# Patient Record
Sex: MALE | URBAN - METROPOLITAN AREA
[De-identification: names, ages, dates, MRNs, and addresses within clinical notes are randomized per-mention and may not be internally consistent; named-entity substitution may affect disease eponyms.]

---

## 2020-06-25 ENCOUNTER — OFFICE VISIT (OUTPATIENT)
Dept: PRIMARY CARE CLINIC | Age: 45
End: 2020-06-25

## 2020-06-25 VITALS — OXYGEN SATURATION: 98 % | TEMPERATURE: 97.5 F | HEART RATE: 56 BPM

## 2020-06-25 PROCEDURE — 99212 OFFICE O/P EST SF 10 MIN: CPT | Performed by: NURSE PRACTITIONER

## 2020-06-25 NOTE — PROGRESS NOTES
ordered  [] POC Strep ordered  [x] COVID 19 ordered    RESULTS:  No results found for this visit on 06/25/20. ASSESSMENT/PLAN:  Sri Wong was seen today for concern for covid-19. Diagnoses and all orders for this visit:    Close exposure to COVID-19 virus  -     COVID-19 Ambulatory; Future  -     COVID-19 Ambulatory        Patient instructed to call PCP if symptoms worsen or fail to improve, and to go to the ED if develops red flags (these symptoms were reviewed with patient). Patient informed can also return to this site for reassessment of related symptoms. Patient expressed understanding. Discharge home with written instructions for:  [] Flu management including medication treatment if qualifies  [x] CDC guildelines for self-quarantine in an asymptomatic patient with COVID-19 exposure   (2 weeks if no symptoms. If symptoms develop then patient follows isolation guidelines below.)  [] CDC guidelines for isolation in symptomatic patient with assumed COVID-19;        TO END ISOLATION: (Patient understands these guidelines are subject to change)  1. No fever for at least 72 hours without medications AND  2. Symptoms have resolved AND  3. At least 10 days from initial symptom onset    Patient understands that a hands-on exam could not be completed during this high risk assessment due to the COVID-19 pandemic. This note will be routed to the patient's PCP to inform of this limited assessment. Written by Ty Ron MA acting as a scribe for Sue England CNP on 6/25/2020 at 7:28 PM.    I, JHON Parra CNP, personally performed the services described in the documentation as scribed by Ty Ron CMA, in my presence and it is both accurate and complete.     Electronically signed by JHON Parra CNP on 6/25/2020 at 7:28 PM.

## 2020-06-27 LAB
SARS-COV-2: NOT DETECTED
SOURCE: NORMAL